# Patient Record
Sex: MALE | Race: WHITE
[De-identification: names, ages, dates, MRNs, and addresses within clinical notes are randomized per-mention and may not be internally consistent; named-entity substitution may affect disease eponyms.]

---

## 2017-06-27 ENCOUNTER — HOSPITAL ENCOUNTER (OUTPATIENT)
Dept: HOSPITAL 53 - M SFHCADAM | Age: 52
End: 2017-06-27
Attending: PHYSICIAN ASSISTANT
Payer: COMMERCIAL

## 2017-06-27 DIAGNOSIS — I10: ICD-10-CM

## 2017-06-27 DIAGNOSIS — K21.9: Primary | ICD-10-CM

## 2017-06-27 LAB
ALBUMIN SERPL BCG-MCNC: 3.8 GM/DL (ref 3.2–5.2)
ALBUMIN/GLOB SERPL: 1.31 {RATIO} (ref 1–1.93)
ALP SERPL-CCNC: 80 U/L (ref 45–117)
ALT SERPL W P-5'-P-CCNC: 35 U/L (ref 12–78)
ANION GAP SERPL CALC-SCNC: 8 MEQ/L (ref 8–16)
AST SERPL-CCNC: 11 U/L (ref 15–37)
BASOPHILS # BLD AUTO: 0 K/MM3 (ref 0–0.2)
BASOPHILS NFR BLD AUTO: 0.7 % (ref 0–1)
BILIRUB SERPL-MCNC: 1 MG/DL (ref 0.2–1)
BUN SERPL-MCNC: 12 MG/DL (ref 7–18)
CALCIUM SERPL-MCNC: 8.7 MG/DL (ref 8.5–10.1)
CHLORIDE SERPL-SCNC: 103 MEQ/L (ref 98–107)
CHOLEST SERPL-MCNC: 175 MG/DL (ref ?–200)
CO2 SERPL-SCNC: 29 MEQ/L (ref 21–32)
CREAT SERPL-MCNC: 1.13 MG/DL (ref 0.7–1.3)
EOSINOPHIL # BLD AUTO: 0.2 K/MM3 (ref 0–0.5)
EOSINOPHIL NFR BLD AUTO: 3.1 % (ref 0–3)
ERYTHROCYTE [DISTWIDTH] IN BLOOD BY AUTOMATED COUNT: 12.8 % (ref 11.5–14.5)
GFR SERPL CREATININE-BSD FRML MDRD: > 60 ML/MIN/{1.73_M2} (ref 56–?)
GLUCOSE SERPL-MCNC: 83 MG/DL (ref 70–105)
LARGE UNSTAINED CELL #: 0.1 K/MM3 (ref 0–0.4)
LARGE UNSTAINED CELL %: 1.2 % (ref 0–4)
LYMPHOCYTES # BLD AUTO: 1.5 K/MM3 (ref 1.5–4.5)
LYMPHOCYTES NFR BLD AUTO: 26.5 % (ref 24–44)
MCH RBC QN AUTO: 28.6 PG (ref 27–33)
MCHC RBC AUTO-ENTMCNC: 34.7 G/DL (ref 32–36.5)
MCV RBC AUTO: 82.3 FL (ref 80–96)
MONOCYTES # BLD AUTO: 0.3 K/MM3 (ref 0–0.8)
MONOCYTES NFR BLD AUTO: 5.9 % (ref 0–5)
NEUTROPHILS # BLD AUTO: 3.5 K/MM3 (ref 1.8–7.7)
NEUTROPHILS NFR BLD AUTO: 62.7 % (ref 36–66)
PLATELET # BLD AUTO: 193 K/MM3 (ref 150–450)
POTASSIUM SERPL-SCNC: 4.4 MEQ/L (ref 3.5–5.1)
PROT SERPL-MCNC: 6.7 GM/DL (ref 6.4–8.2)
SODIUM SERPL-SCNC: 140 MEQ/L (ref 136–145)
TRIGL SERPL-MCNC: 273 MG/DL (ref ?–150)
WBC # BLD AUTO: 5.6 K/MM3 (ref 4–10)

## 2018-07-12 ENCOUNTER — HOSPITAL ENCOUNTER (OUTPATIENT)
Dept: HOSPITAL 53 - M ADAMS | Age: 53
End: 2018-07-12
Attending: PHYSICIAN ASSISTANT
Payer: COMMERCIAL

## 2018-07-12 DIAGNOSIS — M25.511: ICD-10-CM

## 2018-07-12 DIAGNOSIS — M79.601: Primary | ICD-10-CM

## 2018-07-12 PROCEDURE — 73030 X-RAY EXAM OF SHOULDER: CPT

## 2019-08-05 ENCOUNTER — HOSPITAL ENCOUNTER (OUTPATIENT)
Dept: HOSPITAL 53 - M SFHCADAM | Age: 54
End: 2019-08-05
Attending: PHYSICIAN ASSISTANT
Payer: COMMERCIAL

## 2019-08-05 DIAGNOSIS — I10: ICD-10-CM

## 2019-08-05 DIAGNOSIS — K21.9: Primary | ICD-10-CM

## 2019-08-05 LAB
ALBUMIN SERPL BCG-MCNC: 3.8 GM/DL (ref 3.2–5.2)
ALT SERPL W P-5'-P-CCNC: 37 U/L (ref 12–78)
BASOPHILS # BLD AUTO: 0.1 10^3/UL (ref 0–0.2)
BASOPHILS NFR BLD AUTO: 0.8 % (ref 0–1)
BILIRUB SERPL-MCNC: 1.1 MG/DL (ref 0.2–1)
BUN SERPL-MCNC: 18 MG/DL (ref 7–18)
CALCIUM SERPL-MCNC: 9.1 MG/DL (ref 8.5–10.1)
CHLORIDE SERPL-SCNC: 100 MEQ/L (ref 98–107)
CHOLEST SERPL-MCNC: 203 MG/DL (ref ?–200)
CHOLEST/HDLC SERPL: 4.51 {RATIO} (ref ?–5)
CO2 SERPL-SCNC: 30 MEQ/L (ref 21–32)
CREAT SERPL-MCNC: 1.17 MG/DL (ref 0.7–1.3)
EOSINOPHIL # BLD AUTO: 0.3 10^3/UL (ref 0–0.5)
EOSINOPHIL NFR BLD AUTO: 3.5 % (ref 0–3)
GFR SERPL CREATININE-BSD FRML MDRD: > 60 ML/MIN/{1.73_M2} (ref 56–?)
GLUCOSE SERPL-MCNC: 90 MG/DL (ref 70–100)
HCT VFR BLD AUTO: 44.8 % (ref 42–52)
HDLC SERPL-MCNC: 45 MG/DL (ref 40–?)
HGB BLD-MCNC: 15.4 G/DL (ref 13.5–17.5)
LDLC SERPL CALC-MCNC: 97 MG/DL (ref ?–100)
LYMPHOCYTES # BLD AUTO: 2 10^3/UL (ref 1.5–4.5)
LYMPHOCYTES NFR BLD AUTO: 28 % (ref 24–44)
MCH RBC QN AUTO: 28.4 PG (ref 27–33)
MCHC RBC AUTO-ENTMCNC: 34.4 G/DL (ref 32–36.5)
MCV RBC AUTO: 82.7 FL (ref 80–96)
MONOCYTES # BLD AUTO: 0.6 10^3/UL (ref 0–0.8)
MONOCYTES NFR BLD AUTO: 8.3 % (ref 0–5)
NEUTROPHILS # BLD AUTO: 4.2 10^3/UL (ref 1.8–7.7)
NEUTROPHILS NFR BLD AUTO: 58.8 % (ref 36–66)
NONHDLC SERPL-MCNC: 158 MG/DL
PLATELET # BLD AUTO: 220 10^3/UL (ref 150–450)
POTASSIUM SERPL-SCNC: 4.8 MEQ/L (ref 3.5–5.1)
PROT SERPL-MCNC: 6.7 GM/DL (ref 6.4–8.2)
RBC # BLD AUTO: 5.42 10^6/UL (ref 4.3–6.1)
SODIUM SERPL-SCNC: 136 MEQ/L (ref 136–145)
TRIGL SERPL-MCNC: 305 MG/DL (ref ?–150)
WBC # BLD AUTO: 7.2 10^3/UL (ref 4–10)

## 2019-12-03 ENCOUNTER — HOSPITAL ENCOUNTER (OUTPATIENT)
Dept: HOSPITAL 53 - M WUC | Age: 54
End: 2019-12-03
Attending: PHYSICIAN ASSISTANT
Payer: COMMERCIAL

## 2019-12-03 DIAGNOSIS — E78.1: Primary | ICD-10-CM

## 2019-12-03 LAB
CHOLEST SERPL-MCNC: 207 MG/DL (ref ?–200)
CHOLEST/HDLC SERPL: 4.31 {RATIO} (ref ?–5)
HDLC SERPL-MCNC: 48 MG/DL (ref 40–?)
LDLC SERPL CALC-MCNC: 119 MG/DL (ref ?–100)
NONHDLC SERPL-MCNC: 159 MG/DL
TRIGL SERPL-MCNC: 202 MG/DL (ref ?–150)

## 2020-05-22 ENCOUNTER — HOSPITAL ENCOUNTER (OUTPATIENT)
Dept: HOSPITAL 53 - M ADAMS | Age: 55
End: 2020-05-22
Attending: PHYSICIAN ASSISTANT
Payer: COMMERCIAL

## 2020-05-22 DIAGNOSIS — G62.9: ICD-10-CM

## 2020-05-22 DIAGNOSIS — M54.17: Primary | ICD-10-CM

## 2020-05-22 NOTE — REP
REASON:  Back pain and radicular symptoms.

 

PRIORS:  None.

 

Vertebral body height and alignment is within normal limits. The pedicles are

intact bilaterally. There is minimal posterior disc space narrowing at every

level. There is no spondylolysis or spondylolisthesis.

 

IMPRESSION:

 

Minimal discogenic changes, as described above.

 

 

Electronically Signed by

Willy Sims DO 05/22/2020 02:05 P

## 2020-06-04 ENCOUNTER — HOSPITAL ENCOUNTER (OUTPATIENT)
Dept: HOSPITAL 53 - M WHC | Age: 55
End: 2020-06-04
Attending: PHYSICIAN ASSISTANT
Payer: COMMERCIAL

## 2020-06-04 DIAGNOSIS — R22.31: Primary | ICD-10-CM

## 2020-06-05 NOTE — REP
Clinical:  Right upper extremity mass.

 

Technique:  Real time gray scale ultrasound examination using linear high

frequency transducer.

 

Findings:

Directed ultrasound examination at the right upper extremity overlying the

palpable lump was performed.  The subcutaneous tissues appear normal.  No obvious

cyst, mass or abnormality identified.

 

Impression:

No obvious abnormality by sonographic evaluation.

## 2020-06-16 ENCOUNTER — HOSPITAL ENCOUNTER (OUTPATIENT)
Dept: HOSPITAL 53 - M RAD | Age: 55
End: 2020-06-16
Attending: PHYSICIAN ASSISTANT
Payer: COMMERCIAL

## 2020-06-16 DIAGNOSIS — G62.9: Primary | ICD-10-CM

## 2020-06-16 NOTE — REPVR
PROCEDURE INFORMATION: 

Exam: MR Lumbar Spine Without Contrast. 

Exam date and time: 6/16/2020 8:51 AM 

Age: 54 years old 

Clinical indication: Low back pain; Additional info: Polyneuropathy, ashley feet 

pain 



TECHNIQUE: 

Imaging protocol: Multiplanar magnetic resonance images of the lumbar spine 

without intravenous contrast. 



COMPARISON: 

DX SPINE LS COMPLETE 5/22/2020 12:09 PM 



FINDINGS: 

Vertebrae: 2 mm of degenerative retrolisthesis of L5 on S1. No acute fracture 

seen. Small area of increased signal intensity in the left L5 pedicle extending 

into the superior articular facet, could reflect marrow edema related to facet 

arthropathy or perhaps an atypical hemangioma. 

Spinal cord: The conus medullaris ends normally. 

There is disc desiccation from L3-L4 through L5-S1. L4-L5 endplate Schmorl's 

nodes. Mild posterior disc height loss with endplate osteophytic ridging at 

L5-S1. 

L1-L2: Mild arthropathy with small facet joint effusions. No stenoses. 

L2-L3: Mild-to-moderate facet arthropathy and ligamentum flavum buckling with 

small joint effusions. No stenoses. 

L3-L4: Mild diffuse disc bulge facet arthropathy and ligamentum flavum 

buckling. There is a very subtle focal component of left paracentral disc 

protrusion. The central spinal remains patent. No significant lateral recess or 

neural foraminal stenoses. 

L4-L5: Slight disc bulge as well as mild to moderate facet arthropathy. The 

central spinal canal and foramina are patent. Slight lateral recess narrowing, 

probably not significant. 

L5-S1: Mild disc osteophyte as well as mild to moderate facet arthropathy and 

ligamentum flavum buckling. A central disc extrusion with high-intensity zone 

measures approximately 3-4 mm in AP dimension and extends approximately 4 mm 

along the S1 superior endplate. Disc material approximates the S1 nerve roots, 

in particular right S1 nerve root but there is no juan j nerve root compression 

or displacement. The central spinal canal remains patent. Mild-to-moderate 

right and mild left neural foraminal stenoses, the exiting right L5 nerve root 

contact far lateral disc osteophyte. 



Soft tissues: Mild, nonspecific in the subcutaneous fat potentially 

dependent/positional. 



IMPRESSION: 

1. A small disc extrusion at L5-S1 may be cause of S1 distribution 

radiculopathy, in particular on the right. 

2. Mild-to-moderate right neural foraminal at L5-S1 may be cause of right L5 

distribution radiculopathy. 

3. The central spinal canal is patent at all levels. 



Electronically signed by: Charity Mon On 06/16/2020  12:46:23 PM

## 2020-07-22 ENCOUNTER — HOSPITAL ENCOUNTER (OUTPATIENT)
Dept: HOSPITAL 53 - M WUC | Age: 55
End: 2020-07-22
Attending: PHYSICIAN ASSISTANT
Payer: COMMERCIAL

## 2020-07-22 DIAGNOSIS — M51.36: Primary | ICD-10-CM

## 2020-07-22 LAB
CRP SERPL-MCNC: 0.31 MG/DL (ref 0–0.3)
FOLATE SERPL-MCNC: 4.4 NG/ML
RHEUMATOID FACT SERPL-ACNC: < 10 IU/ML (ref ?–15)
T4 FREE SERPL-MCNC: 1.01 NG/DL (ref 0.76–1.46)
TSH SERPL DL<=0.005 MIU/L-ACNC: 1.57 UIU/ML (ref 0.36–3.74)
URATE SERPL-MCNC: 3.4 MG/DL (ref 3.5–7.2)
VIT B12 SERPL-MCNC: 333 PG/ML

## 2020-08-17 LAB
B BURGDOR IGG+IGM SER-ACNC: <0.91 ISR (ref 0–0.9)
B BURGDOR IGM SER IA-ACNC: <0.8 INDEX (ref 0–0.79)

## 2020-09-09 ENCOUNTER — HOSPITAL ENCOUNTER (OUTPATIENT)
Dept: HOSPITAL 53 - M WUC | Age: 55
End: 2020-09-09
Attending: PHYSICAL MEDICINE & REHABILITATION
Payer: COMMERCIAL

## 2020-09-09 DIAGNOSIS — M54.16: ICD-10-CM

## 2020-09-09 DIAGNOSIS — M51.36: Primary | ICD-10-CM

## 2020-09-09 LAB
ALBUMIN SERPL BCG-MCNC: 3.9 GM/DL (ref 3.2–5.2)
ALT SERPL W P-5'-P-CCNC: 33 U/L (ref 12–78)
APTT BLD: 30.1 SECONDS (ref 25–38.4)
BASOPHILS # BLD AUTO: 0.1 10^3/UL (ref 0–0.2)
BASOPHILS NFR BLD AUTO: 0.6 % (ref 0–1)
BILIRUB SERPL-MCNC: 0.8 MG/DL (ref 0.2–1)
BUN SERPL-MCNC: 15 MG/DL (ref 7–18)
CALCIUM SERPL-MCNC: 8.8 MG/DL (ref 8.5–10.1)
CHLORIDE SERPL-SCNC: 104 MEQ/L (ref 98–107)
CO2 SERPL-SCNC: 27 MEQ/L (ref 21–32)
CREAT SERPL-MCNC: 1.19 MG/DL (ref 0.7–1.3)
CRP SERPL-MCNC: 0.45 MG/DL (ref 0–0.3)
EOSINOPHIL # BLD AUTO: 0.2 10^3/UL (ref 0–0.5)
EOSINOPHIL NFR BLD AUTO: 2.2 % (ref 0–3)
GFR SERPL CREATININE-BSD FRML MDRD: > 60 ML/MIN/{1.73_M2} (ref 56–?)
GLUCOSE SERPL-MCNC: 116 MG/DL (ref 70–100)
HCT VFR BLD AUTO: 48.6 % (ref 42–52)
HGB BLD-MCNC: 15.9 G/DL (ref 13.5–17.5)
INR PPP: 0.93
LYMPHOCYTES # BLD AUTO: 2.1 10^3/UL (ref 1.5–5)
LYMPHOCYTES NFR BLD AUTO: 25.3 % (ref 24–44)
MCH RBC QN AUTO: 26.3 PG (ref 27–33)
MCHC RBC AUTO-ENTMCNC: 32.7 G/DL (ref 32–36.5)
MCV RBC AUTO: 80.5 FL (ref 80–96)
MONOCYTES # BLD AUTO: 0.7 10^3/UL (ref 0–0.8)
MONOCYTES NFR BLD AUTO: 7.9 % (ref 0–5)
NEUTROPHILS # BLD AUTO: 5.3 10^3/UL (ref 1.5–8.5)
NEUTROPHILS NFR BLD AUTO: 63.6 % (ref 36–66)
PLATELET # BLD AUTO: 279 10^3/UL (ref 150–450)
POTASSIUM SERPL-SCNC: 4.2 MEQ/L (ref 3.5–5.1)
PROT SERPL-MCNC: 7 GM/DL (ref 6.4–8.2)
PROTHROMBIN TIME: 12.6 SECONDS (ref 11.8–14)
RBC # BLD AUTO: 6.04 10^6/UL (ref 4.3–6.1)
SODIUM SERPL-SCNC: 135 MEQ/L (ref 136–145)
WBC # BLD AUTO: 8.3 10^3/UL (ref 4–10)

## 2020-10-14 ENCOUNTER — HOSPITAL ENCOUNTER (OUTPATIENT)
Dept: HOSPITAL 53 - M LABSMTC | Age: 55
End: 2020-10-14
Attending: PHYSICAL MEDICINE & REHABILITATION
Payer: COMMERCIAL

## 2020-10-14 DIAGNOSIS — Z20.828: ICD-10-CM

## 2020-10-14 DIAGNOSIS — Z01.812: Primary | ICD-10-CM

## 2020-11-24 ENCOUNTER — HOSPITAL ENCOUNTER (OUTPATIENT)
Dept: HOSPITAL 53 - M RAD | Age: 55
End: 2020-11-24
Attending: INTERNAL MEDICINE
Payer: COMMERCIAL

## 2020-11-24 ENCOUNTER — HOSPITAL ENCOUNTER (OUTPATIENT)
Dept: HOSPITAL 53 - M SFHCRHEU | Age: 55
End: 2020-11-24
Attending: INTERNAL MEDICINE
Payer: COMMERCIAL

## 2020-11-24 DIAGNOSIS — M54.5: Primary | ICD-10-CM

## 2020-11-24 DIAGNOSIS — R79.82: ICD-10-CM

## 2020-11-24 DIAGNOSIS — R25.2: ICD-10-CM

## 2020-11-24 DIAGNOSIS — M79.641: ICD-10-CM

## 2020-11-24 DIAGNOSIS — M79.642: ICD-10-CM

## 2020-11-24 DIAGNOSIS — M79.10: Primary | ICD-10-CM

## 2020-11-24 LAB
25(OH)D3 SERPL-MCNC: 34 NG/ML (ref 30–100)
CK SERPL-CCNC: 109 U/L (ref 39–308)
CRP SERPL-MCNC: 0.35 MG/DL (ref 0–0.3)
IRON SERPL-MCNC: 36 UG/DL (ref 65–175)
MAGNESIUM SERPL-MCNC: 2.1 MG/DL (ref 1.8–2.4)
PHOSPHATE SERPL-MCNC: 2.8 MG/DL (ref 2.5–4.9)
VIT B12 SERPL-MCNC: 316 PG/ML (ref 247–911)

## 2020-11-24 NOTE — REP
INDICATION:

OSTEOARTHRITIS OF HAND/SACROILITIS



COMPARISON:

None.



TECHNIQUE:

AP, lateral, bilateral oblique views right and left hand.



FINDINGS:

The osseous structures are relatively symmetric and age-appropriate.  No overt

osteoarthritic or inflammatory arthritic changes are appreciated.  No evidence for

acute or healed injury.



IMPRESSION:

Essentially age-appropriate symmetric bilateral hand radiographs.  No overt arthritic

changes are noted.





<Electronically signed by Rodney Delgado > 11/24/20 1640

## 2020-11-24 NOTE — REP
INDICATION:

LOW BACK PAIN.



COMPARISON:

None.



TECHNIQUE:

Single AP view of the pelvis



FINDINGS:

Osseous structures and bilateral sacroiliac joints are relatively symmetric and normal

for age.



IMPRESSION:

Normal age-appropriate examination.  Normal symmetric appearance of the bilateral

sacroiliac joints.





<Electronically signed by Rodney Delgado > 11/24/20 1640

## 2020-12-09 ENCOUNTER — HOSPITAL ENCOUNTER (OUTPATIENT)
Dept: HOSPITAL 53 - M LABSMTC | Age: 55
End: 2020-12-09
Attending: PHYSICAL MEDICINE & REHABILITATION
Payer: COMMERCIAL

## 2020-12-09 DIAGNOSIS — Z01.812: Primary | ICD-10-CM

## 2020-12-09 DIAGNOSIS — Z20.828: ICD-10-CM

## 2021-01-19 ENCOUNTER — HOSPITAL ENCOUNTER (OUTPATIENT)
Dept: HOSPITAL 53 - M WUC | Age: 56
End: 2021-01-19
Attending: PHYSICIAN ASSISTANT
Payer: COMMERCIAL

## 2021-01-19 DIAGNOSIS — M50.30: Primary | ICD-10-CM

## 2021-01-19 LAB
BUN SERPL-MCNC: 15 MG/DL (ref 7–18)
CREAT SERPL-MCNC: 1.53 MG/DL (ref 0.7–1.3)
GFR SERPL CREATININE-BSD FRML MDRD: 50.4 ML/MIN/{1.73_M2} (ref 56–?)

## 2021-03-30 ENCOUNTER — HOSPITAL ENCOUNTER (OUTPATIENT)
Dept: HOSPITAL 53 - M WUC | Age: 56
End: 2021-03-30
Attending: INTERNAL MEDICINE
Payer: COMMERCIAL

## 2021-03-30 DIAGNOSIS — E61.1: Primary | ICD-10-CM

## 2021-04-14 ENCOUNTER — HOSPITAL ENCOUNTER (OUTPATIENT)
Dept: HOSPITAL 53 - M WUC | Age: 56
End: 2021-04-14
Attending: PHYSICIAN ASSISTANT
Payer: COMMERCIAL

## 2021-04-14 DIAGNOSIS — M47.896: Primary | ICD-10-CM

## 2021-04-14 LAB
APTT BLD: 30.7 SECONDS (ref 24.2–38.5)
CLOSURE TME COLL+EPINEP BLD: 107 SECONDS (ref 74–162)
INR PPP: 0.99
PLATELET # BLD AUTO: 244 10^3/UL (ref 150–450)
PROTHROMBIN TIME: 13.2 SECONDS (ref 12.5–14.3)

## 2021-05-07 ENCOUNTER — HOSPITAL ENCOUNTER (OUTPATIENT)
Dept: HOSPITAL 53 - M WUC | Age: 56
End: 2021-05-07
Attending: INTERNAL MEDICINE
Payer: COMMERCIAL

## 2021-05-07 DIAGNOSIS — R79.82: Primary | ICD-10-CM

## 2021-08-03 ENCOUNTER — HOSPITAL ENCOUNTER (OUTPATIENT)
Dept: HOSPITAL 53 - M SFHCADAM | Age: 56
End: 2021-08-03
Attending: PHYSICIAN ASSISTANT
Payer: COMMERCIAL

## 2021-08-03 DIAGNOSIS — E61.1: ICD-10-CM

## 2021-08-03 DIAGNOSIS — R47.89: ICD-10-CM

## 2021-08-03 DIAGNOSIS — E78.1: ICD-10-CM

## 2021-08-03 DIAGNOSIS — G43.009: ICD-10-CM

## 2021-08-03 DIAGNOSIS — I10: Primary | ICD-10-CM

## 2021-08-03 DIAGNOSIS — K21.9: ICD-10-CM

## 2021-08-03 LAB
ALBUMIN SERPL BCG-MCNC: 4.1 GM/DL (ref 3.2–5.2)
ALT SERPL W P-5'-P-CCNC: 36 U/L (ref 12–78)
BASOPHILS # BLD AUTO: 0 10^3/UL (ref 0–0.2)
BASOPHILS NFR BLD AUTO: 0.5 % (ref 0–1)
BILIRUB SERPL-MCNC: 1.5 MG/DL (ref 0.2–1)
BUN SERPL-MCNC: 15 MG/DL (ref 7–18)
CALCIUM SERPL-MCNC: 8.8 MG/DL (ref 8.5–10.1)
CHLORIDE SERPL-SCNC: 104 MEQ/L (ref 98–107)
CHOLEST SERPL-MCNC: 219 MG/DL (ref ?–200)
CHOLEST/HDLC SERPL: 4.56 {RATIO} (ref ?–5)
CO2 SERPL-SCNC: 28 MEQ/L (ref 21–32)
CREAT SERPL-MCNC: 1.13 MG/DL (ref 0.7–1.3)
EOSINOPHIL # BLD AUTO: 0.1 10^3/UL (ref 0–0.5)
EOSINOPHIL NFR BLD AUTO: 1.7 % (ref 0–3)
GFR SERPL CREATININE-BSD FRML MDRD: > 60 ML/MIN/{1.73_M2} (ref 56–?)
GLUCOSE SERPL-MCNC: 89 MG/DL (ref 70–100)
HCT VFR BLD AUTO: 49.8 % (ref 42–52)
HDLC SERPL-MCNC: 48 MG/DL (ref 40–?)
HGB BLD-MCNC: 16.8 G/DL (ref 13.5–17.5)
IRON SERPL-MCNC: 88 UG/DL (ref 65–175)
LDLC SERPL CALC-MCNC: 137 MG/DL (ref ?–100)
LYMPHOCYTES # BLD AUTO: 1.6 10^3/UL (ref 1.5–5)
LYMPHOCYTES NFR BLD AUTO: 24.3 % (ref 24–44)
MCH RBC QN AUTO: 28.1 PG (ref 27–33)
MCHC RBC AUTO-ENTMCNC: 33.7 G/DL (ref 32–36.5)
MCV RBC AUTO: 83.4 FL (ref 80–96)
MONOCYTES # BLD AUTO: 0.5 10^3/UL (ref 0–0.8)
MONOCYTES NFR BLD AUTO: 7.6 % (ref 2–8)
NEUTROPHILS # BLD AUTO: 4.2 10^3/UL (ref 1.5–8.5)
NEUTROPHILS NFR BLD AUTO: 65.6 % (ref 36–66)
NONHDLC SERPL-MCNC: 171 MG/DL
PLATELET # BLD AUTO: 227 10^3/UL (ref 150–450)
POTASSIUM SERPL-SCNC: 4.5 MEQ/L (ref 3.5–5.1)
PROT SERPL-MCNC: 6.8 GM/DL (ref 6.4–8.2)
RBC # BLD AUTO: 5.97 10^6/UL (ref 4.3–6.1)
SODIUM SERPL-SCNC: 140 MEQ/L (ref 136–145)
TRIGL SERPL-MCNC: 168 MG/DL (ref ?–150)
TSH SERPL DL<=0.005 MIU/L-ACNC: 1.37 UIU/ML (ref 0.36–3.74)
VIT B12 SERPL-MCNC: 416 PG/ML (ref 247–911)
WBC # BLD AUTO: 6.5 10^3/UL (ref 4–10)

## 2021-08-18 ENCOUNTER — HOSPITAL ENCOUNTER (OUTPATIENT)
Dept: HOSPITAL 53 - M PLALAB | Age: 56
End: 2021-08-18
Attending: PSYCHIATRY & NEUROLOGY
Payer: COMMERCIAL

## 2021-08-18 DIAGNOSIS — E53.1: ICD-10-CM

## 2021-08-18 DIAGNOSIS — G62.9: Primary | ICD-10-CM

## 2021-08-18 LAB — PROT SERPL-MCNC: 6.4 GM/DL (ref 6.4–8.2)

## 2021-08-19 LAB
ALBUMIN MFR UR ELPH: 64.2 % (ref 55.8–66.1)
ALBUMIN SERPL-MCNC: 4.11 GM/DL (ref 3.29–5.55)
ALPHA1 GLOB MFR SERPL ELPH: 4.7 % (ref 2.9–4.9)
ALPHA1 GLOB SERPL ELPH-MCNC: 0.3 GM/DL (ref 0.17–0.41)
ALPHA2 GLOB SERPL ELPH-MCNC: 0.79 GM/DL (ref 0.42–0.99)
ALPHA2 GLOB SERPL ELPH-MCNC: 12.4 % (ref 7.1–11.8)
B-GLOBULIN SERPL ELPH-MCNC: 0.34 GM/DL (ref 0.28–0.6)
BETA1 GLOB MFR SERPL ELPH: 5.3 % (ref 4.7–7.2)
BETA2 GLOB MFR SERPL ELPH: 5 % (ref 3.2–6.5)
BETA2 GLOB SERPL ELPH-MCNC: 0.32 GM/DL (ref 0.19–0.55)
GAMMA GLOB 24H MFR UR ELPH: 8.4 % (ref 11.1–18.8)
GAMMA GLOB SERPL ELPH-MCNC: 0.54 GM/DL (ref 0.65–1.58)
IT SERUM INTERPRETATION: (no result)
PROT PATTERN SERPL ELPH-IMP: (no result)

## 2021-11-23 ENCOUNTER — HOSPITAL ENCOUNTER (OUTPATIENT)
Dept: HOSPITAL 53 - M WUC | Age: 56
End: 2021-11-23
Attending: PHYSICIAN ASSISTANT
Payer: COMMERCIAL

## 2021-11-23 DIAGNOSIS — M47.812: Primary | ICD-10-CM

## 2021-11-23 LAB
APTT BLD: 31.2 SECONDS (ref 25.9–37)
INR PPP: 0.95
PLATELET # BLD AUTO: 268 10^3/UL (ref 150–450)
PROTHROMBIN TIME: 13.1 SECONDS (ref 12.7–14.5)

## 2021-12-02 ENCOUNTER — HOSPITAL ENCOUNTER (OUTPATIENT)
Dept: HOSPITAL 53 - M SFHCADAM | Age: 56
End: 2021-12-02
Attending: PHYSICIAN ASSISTANT
Payer: COMMERCIAL

## 2021-12-02 DIAGNOSIS — R53.82: Primary | ICD-10-CM

## 2021-12-04 LAB
TESTOST FREE SERPL-MCNC: 7.7 PG/ML (ref 7.2–24)
TESTOST SERPL-MCNC: 321 NG/DL (ref 264–916)

## 2022-05-06 ENCOUNTER — HOSPITAL ENCOUNTER (OUTPATIENT)
Dept: HOSPITAL 53 - M WUC | Age: 57
End: 2022-05-06
Attending: PHYSICAL MEDICINE & REHABILITATION
Payer: COMMERCIAL

## 2022-05-06 DIAGNOSIS — M51.36: Primary | ICD-10-CM

## 2022-05-06 LAB
APTT BLD: 31.5 SECONDS (ref 25.9–37)
BUN SERPL-MCNC: 14 MG/DL (ref 7–18)
CALCIUM SERPL-MCNC: 9 MG/DL (ref 8.5–10.1)
CHLORIDE SERPL-SCNC: 106 MEQ/L (ref 98–107)
CLOSURE TME COLL+EPINEP BLD: 121 SECONDS (ref 74–162)
CO2 SERPL-SCNC: 29 MEQ/L (ref 21–32)
CREAT SERPL-MCNC: 1.16 MG/DL (ref 0.7–1.3)
GFR SERPL CREATININE-BSD FRML MDRD: > 60 ML/MIN/{1.73_M2} (ref 56–?)
GLUCOSE SERPL-MCNC: 98 MG/DL (ref 70–100)
INR PPP: 0.96
PLATELET # BLD AUTO: 254 10^3/UL (ref 150–450)
POTASSIUM SERPL-SCNC: 4.1 MEQ/L (ref 3.5–5.1)
PROTHROMBIN TIME: 13.2 SECONDS (ref 12.7–14.5)
SODIUM SERPL-SCNC: 141 MEQ/L (ref 136–145)

## 2022-05-27 ENCOUNTER — HOSPITAL ENCOUNTER (OUTPATIENT)
Dept: HOSPITAL 53 - M WUC | Age: 57
End: 2022-05-27
Attending: PHYSICIAN ASSISTANT
Payer: COMMERCIAL

## 2022-05-27 DIAGNOSIS — K21.9: ICD-10-CM

## 2022-05-27 DIAGNOSIS — I10: ICD-10-CM

## 2022-05-27 DIAGNOSIS — E78.1: ICD-10-CM

## 2022-05-27 DIAGNOSIS — Z00.00: Primary | ICD-10-CM

## 2022-05-27 LAB
25(OH)D3 SERPL-MCNC: 22.7 NG/ML (ref 30–100)
ALBUMIN SERPL BCG-MCNC: 3.5 GM/DL (ref 3.2–5.2)
ALT SERPL W P-5'-P-CCNC: 38 U/L (ref 12–78)
BASOPHILS # BLD AUTO: 0.1 10^3/UL (ref 0–0.2)
BASOPHILS NFR BLD AUTO: 0.6 % (ref 0–1)
BILIRUB SERPL-MCNC: 1.2 MG/DL (ref 0.2–1)
BUN SERPL-MCNC: 14 MG/DL (ref 7–18)
CALCIUM SERPL-MCNC: 8.9 MG/DL (ref 8.5–10.1)
CHLORIDE SERPL-SCNC: 110 MEQ/L (ref 98–107)
CHOLEST SERPL-MCNC: 189 MG/DL (ref ?–200)
CHOLEST/HDLC SERPL: 4.39 {RATIO} (ref ?–5)
CO2 SERPL-SCNC: 25 MEQ/L (ref 21–32)
CREAT SERPL-MCNC: 1.1 MG/DL (ref 0.7–1.3)
EOSINOPHIL # BLD AUTO: 0.1 10^3/UL (ref 0–0.5)
EOSINOPHIL NFR BLD AUTO: 1.5 % (ref 0–3)
GFR SERPL CREATININE-BSD FRML MDRD: > 60 ML/MIN/{1.73_M2} (ref 56–?)
GLUCOSE SERPL-MCNC: 93 MG/DL (ref 70–100)
HCT VFR BLD AUTO: 44 % (ref 42–52)
HDLC SERPL-MCNC: 43 MG/DL (ref 40–?)
HGB BLD-MCNC: 15.1 G/DL (ref 13.5–17.5)
LDLC SERPL CALC-MCNC: 115 MG/DL (ref ?–100)
LYMPHOCYTES # BLD AUTO: 1.9 10^3/UL (ref 1.5–5)
LYMPHOCYTES NFR BLD AUTO: 22.5 % (ref 24–44)
MCH RBC QN AUTO: 28.9 PG (ref 27–33)
MCHC RBC AUTO-ENTMCNC: 34.3 G/DL (ref 32–36.5)
MCV RBC AUTO: 84.3 FL (ref 80–96)
MONOCYTES # BLD AUTO: 0.7 10^3/UL (ref 0–0.8)
MONOCYTES NFR BLD AUTO: 7.7 % (ref 2–8)
NEUTROPHILS # BLD AUTO: 5.7 10^3/UL (ref 1.5–8.5)
NEUTROPHILS NFR BLD AUTO: 67.3 % (ref 36–66)
NONHDLC SERPL-MCNC: 146 MG/DL
PLATELET # BLD AUTO: 241 10^3/UL (ref 150–450)
POTASSIUM SERPL-SCNC: 3.7 MEQ/L (ref 3.5–5.1)
PROT SERPL-MCNC: 6.5 GM/DL (ref 6.4–8.2)
RBC # BLD AUTO: 5.22 10^6/UL (ref 4.3–6.1)
SODIUM SERPL-SCNC: 144 MEQ/L (ref 136–145)
TRIGL SERPL-MCNC: 154 MG/DL (ref ?–150)
TSH SERPL DL<=0.005 MIU/L-ACNC: 0.96 UIU/ML (ref 0.36–3.74)
WBC # BLD AUTO: 8.5 10^3/UL (ref 4–10)

## 2022-06-01 ENCOUNTER — HOSPITAL ENCOUNTER (OUTPATIENT)
Dept: HOSPITAL 53 - M WUC | Age: 57
End: 2022-06-01
Attending: PSYCHIATRY & NEUROLOGY
Payer: COMMERCIAL

## 2022-06-01 DIAGNOSIS — E53.8: ICD-10-CM

## 2022-06-01 DIAGNOSIS — R42: Primary | ICD-10-CM

## 2022-06-01 LAB
FOLATE SERPL-MCNC: 20.6 NG/ML
VIT B12 SERPL-MCNC: 495 PG/ML

## 2022-11-29 ENCOUNTER — HOSPITAL ENCOUNTER (OUTPATIENT)
Dept: HOSPITAL 53 - M WUC | Age: 57
End: 2022-11-29
Attending: PHYSICIAN ASSISTANT
Payer: COMMERCIAL

## 2022-11-29 DIAGNOSIS — Z00.00: Primary | ICD-10-CM

## 2022-11-29 DIAGNOSIS — K21.9: ICD-10-CM

## 2022-11-29 DIAGNOSIS — I10: ICD-10-CM

## 2022-11-29 DIAGNOSIS — E78.1: ICD-10-CM

## 2022-11-29 LAB
25(OH)D3 SERPL-MCNC: 31.7 NG/ML (ref 20–100)
ALBUMIN SERPL BCG-MCNC: 3.9 G/DL (ref 3.2–5.2)
ALP SERPL-CCNC: 92 U/L (ref 46–116)
ALT SERPL W P-5'-P-CCNC: 32 U/L (ref 7–40)
AST SERPL-CCNC: 18 U/L (ref ?–34)
BASOPHILS # BLD AUTO: 0.1 10^3/UL (ref 0–0.2)
BASOPHILS NFR BLD AUTO: 0.6 % (ref 0–1)
BILIRUB SERPL-MCNC: 1.6 MG/DL (ref 0.3–1.2)
BUN SERPL-MCNC: 12 MG/DL (ref 9–23)
CALCIUM SERPL-MCNC: 9.1 MG/DL (ref 8.5–10.1)
CHLORIDE SERPL-SCNC: 101 MMOL/L (ref 98–107)
CHOLEST SERPL-MCNC: 184 MG/DL (ref ?–200)
CHOLEST/HDLC SERPL: 4.18 {RATIO} (ref ?–5)
CO2 SERPL-SCNC: 29 MMOL/L (ref 20–31)
CREAT SERPL-MCNC: 1.14 MG/DL (ref 0.7–1.3)
EOSINOPHIL # BLD AUTO: 0.2 10^3/UL (ref 0–0.5)
EOSINOPHIL NFR BLD AUTO: 1.8 % (ref 0–3)
GFR SERPL CREATININE-BSD FRML MDRD: > 60 ML/MIN/{1.73_M2} (ref 56–?)
GLUCOSE SERPL-MCNC: 91 MG/DL (ref 60–100)
HCT VFR BLD AUTO: 45.7 % (ref 42–52)
HDLC SERPL-MCNC: 44 MG/DL (ref 40–?)
HGB BLD-MCNC: 15.2 G/DL (ref 13.5–17.5)
LDLC SERPL CALC-MCNC: 97.2 MG/DL (ref ?–100)
LYMPHOCYTES # BLD AUTO: 1.9 10^3/UL (ref 1.5–5)
LYMPHOCYTES NFR BLD AUTO: 21.6 % (ref 24–44)
MCH RBC QN AUTO: 28.1 PG (ref 27–33)
MCHC RBC AUTO-ENTMCNC: 33.3 G/DL (ref 32–36.5)
MCV RBC AUTO: 84.6 FL (ref 80–96)
MONOCYTES # BLD AUTO: 0.6 10^3/UL (ref 0–0.8)
MONOCYTES NFR BLD AUTO: 7.3 % (ref 2–8)
NEUTROPHILS # BLD AUTO: 5.9 10^3/UL (ref 1.5–8.5)
NEUTROPHILS NFR BLD AUTO: 68.2 % (ref 36–66)
NONHDLC SERPL-MCNC: 140 MG/DL
PLATELET # BLD AUTO: 251 10^3/UL (ref 150–450)
POTASSIUM SERPL-SCNC: 4.3 MMOL/L (ref 3.5–5.1)
PROT SERPL-MCNC: 6.3 G/DL (ref 5.7–8.2)
RBC # BLD AUTO: 5.4 10^6/UL (ref 4.3–6.1)
SODIUM SERPL-SCNC: 139 MMOL/L (ref 136–145)
TRIGL SERPL-MCNC: 214 MG/DL (ref ?–150)
TSH SERPL DL<=0.005 MIU/L-ACNC: 0.97 UIU/ML (ref 0.55–4.78)
WBC # BLD AUTO: 8.7 10^3/UL (ref 4–10)

## 2022-12-13 ENCOUNTER — HOSPITAL ENCOUNTER (OUTPATIENT)
Dept: HOSPITAL 53 - M RAD | Age: 57
End: 2022-12-13
Attending: PSYCHIATRY & NEUROLOGY
Payer: COMMERCIAL

## 2022-12-13 DIAGNOSIS — I73.9: Primary | ICD-10-CM

## 2022-12-13 DIAGNOSIS — K76.0: ICD-10-CM

## 2022-12-13 DIAGNOSIS — K80.20: ICD-10-CM

## 2022-12-13 DIAGNOSIS — K43.9: ICD-10-CM

## 2023-02-27 ENCOUNTER — HOSPITAL ENCOUNTER (OUTPATIENT)
Dept: HOSPITAL 53 - M LABSMTC | Age: 58
End: 2023-02-27
Attending: ANESTHESIOLOGY
Payer: COMMERCIAL

## 2023-02-27 DIAGNOSIS — Z20.822: ICD-10-CM

## 2023-02-27 DIAGNOSIS — Z01.812: Primary | ICD-10-CM

## 2023-03-03 ENCOUNTER — HOSPITAL ENCOUNTER (OUTPATIENT)
Dept: HOSPITAL 53 - M OPP | Age: 58
Discharge: HOME | End: 2023-03-03
Attending: SURGERY
Payer: COMMERCIAL

## 2023-03-03 VITALS — SYSTOLIC BLOOD PRESSURE: 126 MMHG | DIASTOLIC BLOOD PRESSURE: 67 MMHG

## 2023-03-03 VITALS — HEIGHT: 72 IN | WEIGHT: 229 LBS | BODY MASS INDEX: 31.02 KG/M2

## 2023-03-03 DIAGNOSIS — D17.24: Primary | ICD-10-CM

## 2023-03-03 DIAGNOSIS — Z88.0: ICD-10-CM

## 2023-03-03 DIAGNOSIS — I10: ICD-10-CM

## 2023-03-03 DIAGNOSIS — Z79.899: ICD-10-CM

## 2023-03-03 DIAGNOSIS — Z88.8: ICD-10-CM

## 2023-03-03 DIAGNOSIS — K21.9: ICD-10-CM

## 2023-03-03 PROCEDURE — 11406 EXC TR-EXT B9+MARG >4.0 CM: CPT

## 2023-03-03 PROCEDURE — 88304 TISSUE EXAM BY PATHOLOGIST: CPT

## 2023-12-07 ENCOUNTER — HOSPITAL ENCOUNTER (OUTPATIENT)
Dept: HOSPITAL 53 - M WUC | Age: 58
End: 2023-12-07
Attending: PHYSICIAN ASSISTANT
Payer: COMMERCIAL

## 2023-12-07 DIAGNOSIS — I10: Primary | ICD-10-CM

## 2023-12-07 LAB
ALBUMIN SERPL BCG-MCNC: 3.8 G/DL (ref 3.2–5.2)
ALP SERPL-CCNC: 77 U/L (ref 46–116)
ALT SERPL W P-5'-P-CCNC: 28 U/L (ref 7–40)
AST SERPL-CCNC: 11 U/L (ref ?–34)
BILIRUB SERPL-MCNC: 1 MG/DL (ref 0.3–1.2)
BUN SERPL-MCNC: 15 MG/DL (ref 9–23)
CALCIUM SERPL-MCNC: 9 MG/DL (ref 8.5–10.1)
CHLORIDE SERPL-SCNC: 103 MMOL/L (ref 98–107)
CO2 SERPL-SCNC: 28 MMOL/L (ref 20–31)
CREAT SERPL-MCNC: 1 MG/DL (ref 0.7–1.3)
GFR SERPL CREATININE-BSD FRML MDRD: > 60 ML/MIN/{1.73_M2} (ref 56–?)
GLUCOSE SERPL-MCNC: 92 MG/DL (ref 60–100)
POTASSIUM SERPL-SCNC: 4.5 MMOL/L (ref 3.5–5.1)
PROT SERPL-MCNC: 6.4 G/DL (ref 5.7–8.2)
SODIUM SERPL-SCNC: 139 MMOL/L (ref 136–145)